# Patient Record
Sex: FEMALE | Race: WHITE | NOT HISPANIC OR LATINO | Employment: FULL TIME | URBAN - METROPOLITAN AREA
[De-identification: names, ages, dates, MRNs, and addresses within clinical notes are randomized per-mention and may not be internally consistent; named-entity substitution may affect disease eponyms.]

---

## 2018-06-06 ENCOUNTER — TRANSCRIBE ORDERS (OUTPATIENT)
Dept: ADMINISTRATIVE | Facility: HOSPITAL | Age: 53
End: 2018-06-06

## 2018-06-06 DIAGNOSIS — Z12.39 SCREENING BREAST EXAMINATION: Primary | ICD-10-CM

## 2018-06-11 ENCOUNTER — HOSPITAL ENCOUNTER (OUTPATIENT)
Dept: RADIOLOGY | Facility: HOSPITAL | Age: 53
Discharge: HOME | End: 2018-06-11
Payer: COMMERCIAL

## 2018-06-11 DIAGNOSIS — Z12.39 SCREENING BREAST EXAMINATION: ICD-10-CM

## 2018-06-11 PROCEDURE — 77067 SCR MAMMO BI INCL CAD: CPT

## 2018-06-13 ENCOUNTER — OFFICE VISIT (OUTPATIENT)
Dept: FAMILY MEDICINE CLINIC | Facility: CLINIC | Age: 53
End: 2018-06-13
Payer: COMMERCIAL

## 2018-06-13 VITALS
HEIGHT: 60 IN | BODY MASS INDEX: 38.09 KG/M2 | HEART RATE: 80 BPM | TEMPERATURE: 98.8 F | DIASTOLIC BLOOD PRESSURE: 76 MMHG | SYSTOLIC BLOOD PRESSURE: 120 MMHG | RESPIRATION RATE: 16 BRPM | WEIGHT: 194 LBS

## 2018-06-13 DIAGNOSIS — E66.9 OBESITY (BMI 30-39.9): ICD-10-CM

## 2018-06-13 DIAGNOSIS — M79.644 FINGER PAIN, RIGHT: Primary | ICD-10-CM

## 2018-06-13 DIAGNOSIS — K21.9 GASTROESOPHAGEAL REFLUX DISEASE, ESOPHAGITIS PRESENCE NOT SPECIFIED: ICD-10-CM

## 2018-06-13 PROCEDURE — 99204 OFFICE O/P NEW MOD 45 MIN: CPT | Performed by: FAMILY MEDICINE

## 2018-06-13 RX ORDER — PROPRANOLOL HYDROCHLORIDE 80 MG/1
TABLET ORAL
COMMUNITY
End: 2018-06-13

## 2018-06-13 RX ORDER — PANTOPRAZOLE SODIUM 40 MG/1
40 TABLET, DELAYED RELEASE ORAL DAILY
Qty: 99 TABLET | Refills: 0
Start: 2018-06-13

## 2018-06-13 NOTE — PROGRESS NOTES
Assessment/Plan:    No problem-specific Assessment & Plan notes found for this encounter  Attempt to remove after elastic finger compression was not successful so the pt agreed to have the ring cut which was performed w/o complication or injury  Ring was cut in 2 places and removed w/o skin injury and pt was given all ring parts  Local care for finger pain was recommended  Procedure required significant efforts but was successful  Entire time with patient took 45 minutes including counseling and prolonged procedure  Diagnoses and all orders for this visit:    Finger pain, right    Gastroesophageal reflux disease, esophagitis presence not specified    Obesity (BMI 30-39  9)    Other orders  -     Discontinue: propranolol (INDERAL) 80 mg tablet; Take by mouth  -     pantoprazole (PROTONIX) 40 mg tablet; Take 1 tablet (40 mg total) by mouth daily              Return if symptoms worsen or fail to improve  Subjective:      Patient ID: Jt Noel is a 45 y o  female  Chief Complaint   Patient presents with   Fifi Verdugo Stuck     pt sts shes here because she cannot get ring off her finger  drhlpn       HPI    New pt  Finger #4 right hand ring is stuck  Been on for 9y  Has gained wt  Arthritis set in   so just decided to remove it  No swelling initially but had some after attempts to remove  Decided to remove and was successful with left #4 successful  Right side stuck however  gerd stable, on pantoprazole  Trigger finger left hand resolved after injection  [facility] but [facility] did not have [facility] cutter    The following portions of the patient's history were reviewed and updated as appropriate: allergies, current medications, past family history, past medical history, past social history, past surgical history and problem list     Review of Systems   Constitutional: Negative for fever  Respiratory: Negative for shortness of breath  Cardiovascular: Negative for chest pain  Gastrointestinal: Negative for abdominal pain  Musculoskeletal: Positive for arthralgias  Negative for joint swelling and myalgias  Skin: Negative for pallor and wound  No current outpatient prescriptions on file  No current facility-administered medications for this visit  Objective:    /76   Pulse 80   Temp 98 8 °F (37 1 °C)   Resp 16   Ht 5' (1 524 m)   Wt 88 kg (194 lb)   LMP 11/07/2016   BMI 37 89 kg/m²        Physical Exam   Constitutional: She appears well-developed  HENT:   Head: Normocephalic  Eyes: Conjunctivae are normal    Neck: Neck supple  Cardiovascular: Normal rate and intact distal pulses  Pulmonary/Chest: Effort normal  No respiratory distress  Abdominal: Soft  Musculoskeletal: She exhibits tenderness  She exhibits no edema or deformity  Finger #2 mild tenderness around PIP joint from ring removal attempt w/o wound or ecchymosis   Neurological: She is alert  Skin: Skin is warm and dry  Psychiatric: Her behavior is normal  Thought content normal    Nursing note and vitals reviewed               Олег Ibarra DO

## 2018-08-31 ENCOUNTER — TELEPHONE (OUTPATIENT)
Dept: FAMILY MEDICINE CLINIC | Facility: CLINIC | Age: 53
End: 2018-08-31

## 2018-09-04 NOTE — TELEPHONE ENCOUNTER
I believe this is not our patient, in office not sts patient [facility] but [facility] not [facility]  Her sister comes here and called to see if we had one  Closing this task   danielle

## 2019-06-05 ENCOUNTER — TRANSCRIBE ORDERS (OUTPATIENT)
Dept: ADMINISTRATIVE | Facility: HOSPITAL | Age: 54
End: 2019-06-05

## 2019-06-05 DIAGNOSIS — Z12.31 VISIT FOR SCREENING MAMMOGRAM: Primary | ICD-10-CM

## 2019-06-13 ENCOUNTER — HOSPITAL ENCOUNTER (OUTPATIENT)
Dept: RADIOLOGY | Facility: HOSPITAL | Age: 54
Discharge: HOME | End: 2019-06-13
Payer: COMMERCIAL

## 2019-06-13 VITALS — WEIGHT: 194 LBS | HEIGHT: 60 IN | BODY MASS INDEX: 38.09 KG/M2

## 2019-06-13 DIAGNOSIS — Z12.31 VISIT FOR SCREENING MAMMOGRAM: ICD-10-CM

## 2019-06-13 PROCEDURE — 77067 SCR MAMMO BI INCL CAD: CPT

## 2019-06-13 PROCEDURE — 77063 BREAST TOMOSYNTHESIS BI: CPT

## 2021-06-28 ENCOUNTER — APPOINTMENT (EMERGENCY)
Dept: RADIOLOGY | Facility: HOSPITAL | Age: 56
End: 2021-06-28
Payer: COMMERCIAL

## 2021-06-28 ENCOUNTER — HOSPITAL ENCOUNTER (OUTPATIENT)
Facility: HOSPITAL | Age: 56
Setting detail: OBSERVATION
Discharge: HOME | End: 2021-06-29
Attending: EMERGENCY MEDICINE | Admitting: INTERNAL MEDICINE
Payer: COMMERCIAL

## 2021-06-28 ENCOUNTER — APPOINTMENT (OUTPATIENT)
Dept: RADIOLOGY | Facility: HOSPITAL | Age: 56
End: 2021-06-28
Payer: COMMERCIAL

## 2021-06-28 ENCOUNTER — APPOINTMENT (OUTPATIENT)
Dept: NON INVASIVE DIAGNOSTICS | Facility: HOSPITAL | Age: 56
End: 2021-06-28
Payer: COMMERCIAL

## 2021-06-28 DIAGNOSIS — N39.0 UTI (URINARY TRACT INFECTION): ICD-10-CM

## 2021-06-28 DIAGNOSIS — R42 DIZZINESS: Primary | ICD-10-CM

## 2021-06-28 DIAGNOSIS — Z72.0 NICOTINE ABUSE: ICD-10-CM

## 2021-06-28 PROBLEM — R82.71 BACTERIURIA: Status: ACTIVE | Noted: 2021-06-28

## 2021-06-28 LAB
ALBUMIN SERPL BCP-MCNC: 3.6 G/DL (ref 3.5–5)
ALP SERPL-CCNC: 79 U/L (ref 46–116)
ALT SERPL W P-5'-P-CCNC: 31 U/L (ref 12–78)
ANION GAP SERPL CALCULATED.3IONS-SCNC: 12 MMOL/L (ref 4–13)
AST SERPL W P-5'-P-CCNC: 19 U/L (ref 5–45)
ATRIAL RATE: 80 BPM
BACTERIA UR QL AUTO: ABNORMAL /HPF
BASOPHILS # BLD AUTO: 0.05 THOUSANDS/ΜL (ref 0–0.1)
BASOPHILS NFR BLD AUTO: 1 % (ref 0–1)
BILIRUB SERPL-MCNC: 0.27 MG/DL (ref 0.2–1)
BILIRUB UR QL STRIP: NEGATIVE
BUN SERPL-MCNC: 15 MG/DL (ref 5–25)
CALCIUM SERPL-MCNC: 9.1 MG/DL (ref 8.3–10.1)
CHLORIDE SERPL-SCNC: 105 MMOL/L (ref 100–108)
CLARITY UR: CLEAR
CO2 SERPL-SCNC: 26 MMOL/L (ref 21–32)
COLOR UR: YELLOW
CREAT SERPL-MCNC: 0.77 MG/DL (ref 0.6–1.3)
EOSINOPHIL # BLD AUTO: 0.41 THOUSAND/ΜL (ref 0–0.61)
EOSINOPHIL NFR BLD AUTO: 5 % (ref 0–6)
ERYTHROCYTE [DISTWIDTH] IN BLOOD BY AUTOMATED COUNT: 13.2 % (ref 11.6–15.1)
GFR SERPL CREATININE-BSD FRML MDRD: 86 ML/MIN/1.73SQ M
GLUCOSE SERPL-MCNC: 122 MG/DL (ref 65–140)
GLUCOSE UR STRIP-MCNC: NEGATIVE MG/DL
HCT VFR BLD AUTO: 39 % (ref 34.8–46.1)
HGB BLD-MCNC: 12.7 G/DL (ref 11.5–15.4)
HGB UR QL STRIP.AUTO: NEGATIVE
IMM GRANULOCYTES # BLD AUTO: 0.15 THOUSAND/UL (ref 0–0.2)
IMM GRANULOCYTES NFR BLD AUTO: 2 % (ref 0–2)
KETONES UR STRIP-MCNC: NEGATIVE MG/DL
LEUKOCYTE ESTERASE UR QL STRIP: ABNORMAL
LYMPHOCYTES # BLD AUTO: 3.43 THOUSANDS/ΜL (ref 0.6–4.47)
LYMPHOCYTES NFR BLD AUTO: 38 % (ref 14–44)
MCH RBC QN AUTO: 29.4 PG (ref 26.8–34.3)
MCHC RBC AUTO-ENTMCNC: 32.6 G/DL (ref 31.4–37.4)
MCV RBC AUTO: 90 FL (ref 82–98)
MONOCYTES # BLD AUTO: 0.55 THOUSAND/ΜL (ref 0.17–1.22)
MONOCYTES NFR BLD AUTO: 6 % (ref 4–12)
NEUTROPHILS # BLD AUTO: 4.54 THOUSANDS/ΜL (ref 1.85–7.62)
NEUTS SEG NFR BLD AUTO: 48 % (ref 43–75)
NITRITE UR QL STRIP: NEGATIVE
NON-SQ EPI CELLS URNS QL MICRO: ABNORMAL /HPF
NRBC BLD AUTO-RTO: 0 /100 WBCS
P AXIS: 48 DEGREES
PH UR STRIP.AUTO: 6.5 [PH]
PLATELET # BLD AUTO: 300 THOUSANDS/UL (ref 149–390)
PMV BLD AUTO: 9.5 FL (ref 8.9–12.7)
POTASSIUM SERPL-SCNC: 3.7 MMOL/L (ref 3.5–5.3)
PR INTERVAL: 124 MS
PROT SERPL-MCNC: 7.3 G/DL (ref 6.4–8.2)
PROT UR STRIP-MCNC: NEGATIVE MG/DL
QRS AXIS: 8 DEGREES
QRSD INTERVAL: 82 MS
QT INTERVAL: 392 MS
QTC INTERVAL: 452 MS
RBC # BLD AUTO: 4.32 MILLION/UL (ref 3.81–5.12)
RBC #/AREA URNS AUTO: ABNORMAL /HPF
SODIUM SERPL-SCNC: 143 MMOL/L (ref 136–145)
SP GR UR STRIP.AUTO: 1.01 (ref 1–1.03)
T WAVE AXIS: 31 DEGREES
TROPONIN I SERPL-MCNC: <0.02 NG/ML
TSH SERPL DL<=0.05 MIU/L-ACNC: 1.22 UIU/ML (ref 0.36–3.74)
UROBILINOGEN UR QL STRIP.AUTO: 0.2 E.U./DL
VENTRICULAR RATE: 80 BPM
VIT B12 SERPL-MCNC: 391 PG/ML (ref 100–900)
WBC # BLD AUTO: 9.13 THOUSAND/UL (ref 4.31–10.16)
WBC #/AREA URNS AUTO: ABNORMAL /HPF

## 2021-06-28 PROCEDURE — 85025 COMPLETE CBC W/AUTO DIFF WBC: CPT | Performed by: EMERGENCY MEDICINE

## 2021-06-28 PROCEDURE — 96365 THER/PROPH/DIAG IV INF INIT: CPT

## 2021-06-28 PROCEDURE — 70551 MRI BRAIN STEM W/O DYE: CPT

## 2021-06-28 PROCEDURE — 99219 PR INITIAL OBSERVATION CARE/DAY 50 MINUTES: CPT | Performed by: INTERNAL MEDICINE

## 2021-06-28 PROCEDURE — 93005 ELECTROCARDIOGRAM TRACING: CPT

## 2021-06-28 PROCEDURE — 93306 TTE W/DOPPLER COMPLETE: CPT

## 2021-06-28 PROCEDURE — 99285 EMERGENCY DEPT VISIT HI MDM: CPT | Performed by: EMERGENCY MEDICINE

## 2021-06-28 PROCEDURE — 70496 CT ANGIOGRAPHY HEAD: CPT

## 2021-06-28 PROCEDURE — 93010 ELECTROCARDIOGRAM REPORT: CPT | Performed by: INTERNAL MEDICINE

## 2021-06-28 PROCEDURE — 82607 VITAMIN B-12: CPT | Performed by: NURSE PRACTITIONER

## 2021-06-28 PROCEDURE — 80053 COMPREHEN METABOLIC PANEL: CPT | Performed by: EMERGENCY MEDICINE

## 2021-06-28 PROCEDURE — 36415 COLL VENOUS BLD VENIPUNCTURE: CPT | Performed by: EMERGENCY MEDICINE

## 2021-06-28 PROCEDURE — 96361 HYDRATE IV INFUSION ADD-ON: CPT

## 2021-06-28 PROCEDURE — 99285 EMERGENCY DEPT VISIT HI MDM: CPT

## 2021-06-28 PROCEDURE — 70498 CT ANGIOGRAPHY NECK: CPT

## 2021-06-28 PROCEDURE — 81001 URINALYSIS AUTO W/SCOPE: CPT | Performed by: EMERGENCY MEDICINE

## 2021-06-28 PROCEDURE — 84484 ASSAY OF TROPONIN QUANT: CPT | Performed by: EMERGENCY MEDICINE

## 2021-06-28 PROCEDURE — 99205 OFFICE O/P NEW HI 60 MIN: CPT | Performed by: PSYCHIATRY & NEUROLOGY

## 2021-06-28 PROCEDURE — 87086 URINE CULTURE/COLONY COUNT: CPT | Performed by: NURSE PRACTITIONER

## 2021-06-28 PROCEDURE — G1004 CDSM NDSC: HCPCS

## 2021-06-28 PROCEDURE — 84443 ASSAY THYROID STIM HORMONE: CPT | Performed by: EMERGENCY MEDICINE

## 2021-06-28 RX ORDER — ACETAMINOPHEN 325 MG/1
650 TABLET ORAL EVERY 6 HOURS PRN
Status: DISCONTINUED | OUTPATIENT
Start: 2021-06-28 | End: 2021-06-29 | Stop reason: HOSPADM

## 2021-06-28 RX ORDER — ASPIRIN 81 MG/1
81 TABLET ORAL DAILY
Status: DISCONTINUED | OUTPATIENT
Start: 2021-06-29 | End: 2021-06-29 | Stop reason: HOSPADM

## 2021-06-28 RX ORDER — NICOTINE 21 MG/24HR
1 PATCH, TRANSDERMAL 24 HOURS TRANSDERMAL DAILY
Status: DISCONTINUED | OUTPATIENT
Start: 2021-06-28 | End: 2021-06-29 | Stop reason: HOSPADM

## 2021-06-28 RX ORDER — PANTOPRAZOLE SODIUM 40 MG/1
40 TABLET, DELAYED RELEASE ORAL
Status: DISCONTINUED | OUTPATIENT
Start: 2021-06-29 | End: 2021-06-29 | Stop reason: HOSPADM

## 2021-06-28 RX ORDER — SODIUM CHLORIDE 9 MG/ML
100 INJECTION, SOLUTION INTRAVENOUS CONTINUOUS
Status: DISCONTINUED | OUTPATIENT
Start: 2021-06-28 | End: 2021-06-29

## 2021-06-28 RX ORDER — MECLIZINE HYDROCHLORIDE 25 MG/1
50 TABLET ORAL ONCE
Status: COMPLETED | OUTPATIENT
Start: 2021-06-28 | End: 2021-06-28

## 2021-06-28 RX ORDER — CEFTRIAXONE 1 G/50ML
1000 INJECTION, SOLUTION INTRAVENOUS EVERY 24 HOURS
Status: DISCONTINUED | OUTPATIENT
Start: 2021-06-29 | End: 2021-06-29 | Stop reason: HOSPADM

## 2021-06-28 RX ORDER — CEFTRIAXONE 1 G/50ML
1000 INJECTION, SOLUTION INTRAVENOUS ONCE
Status: COMPLETED | OUTPATIENT
Start: 2021-06-28 | End: 2021-06-28

## 2021-06-28 RX ORDER — ASPIRIN 81 MG/1
81 TABLET ORAL DAILY
COMMUNITY

## 2021-06-28 RX ORDER — ONDANSETRON 2 MG/ML
4 INJECTION INTRAMUSCULAR; INTRAVENOUS EVERY 6 HOURS PRN
Status: DISCONTINUED | OUTPATIENT
Start: 2021-06-28 | End: 2021-06-29 | Stop reason: HOSPADM

## 2021-06-28 RX ADMIN — MECLIZINE HYDROCHLORIDE 50 MG: 25 TABLET ORAL at 06:41

## 2021-06-28 RX ADMIN — IOHEXOL 85 ML: 350 INJECTION, SOLUTION INTRAVENOUS at 08:09

## 2021-06-28 RX ADMIN — SODIUM CHLORIDE 1000 ML: 0.9 INJECTION, SOLUTION INTRAVENOUS at 06:37

## 2021-06-28 RX ADMIN — SODIUM CHLORIDE 100 ML/HR: 0.9 INJECTION, SOLUTION INTRAVENOUS at 19:47

## 2021-06-28 RX ADMIN — CEFTRIAXONE 1000 MG: 1 INJECTION, SOLUTION INTRAVENOUS at 08:59

## 2021-06-28 NOTE — ASSESSMENT & PLAN NOTE
Patient presents with feeling of Orta Ari "in a cloud" since Saturday afternoon 2 days ago  Reports worse in sitting up or walking, better when laying in bed  Denies focal neuro deficit  Denies family history of CVA or MI   CTA head and neck in ED showed no acute intracranial abnormality, unremarkable CTA  EKG normal sinus rhythm  Troponin negative, telemetry without any arrhythmia  Patient received Antivert, NS bolus, IV Rocephin in ED  MRI brain without any acute abnormality  2D echo normal EF   TSH, B12 within normal limit  Orthostatic negative  Seen by neuro, input appreciated  Likely atypical symptoms in setting of dehydration/possible UTI/BPPV  Clinically improved now asymptomatic  · Recommended hydration  · Diet and lifestyle modification  · Monitoring of symptoms, outpatient PT and meclizine if recurrence of symptoms    Patient was educated

## 2021-06-28 NOTE — ASSESSMENT & PLAN NOTE
Patient asymptomatic    Received IV Rocephin in ED  Will hold off on further antibiotic  Check urine culture

## 2021-06-28 NOTE — CONSULTS
[facility]   Neurology Initial Consult    Ema Gustafson is a 60 y o  female  [address] [facility] [address]          Information obtained from:   Chief Complaint   Patient presents with    Dizziness     Pt states she feels like she has been walking lop-sided for 3d now  No hx of vertigo  Assessment/Plan:    1  Encephalopathy  2  UTI  3  dizziness    -Monitored on Telemetry  -Vitals/Neuro Assessments  -MRI of the Brain- rule out central process  Negative for acute findings  -Abx per Medical Management  -can use meclizine 25mg q8hrs prn Vertigo  -Echo with shunt study  -  Labs: Lipid profile and A1c  -PT/OT    Patient is a 77-year-old female with a transient episode of vertigo like dizziness on Saturday  Patient stated things were spinning around her she felt as though she could fall  Patient stated that she had some improvement when able to lay down however since that time she has felt off  Patient states she does not feel herself feels as though she has a little bit foggy  Patient reports that she does not have much oral hydration at baseline, thought maybe she was dehydrated however her labs not reflect this  Patient reports having frequency of urination at baseline, denied having any dysuria with regards to pain or urgency  Upon arrival to the ER, patient's UA was positive for leukocytes, wbc's and bacteria  CTA of the head and neck was negative for any acute findings  Patient denies having any current dizziness / vertigo however reports feeling like she is in a brain fog  Patient did report having some gait ataxia, feels as though she is walking off to the side  Patient reports that she has had some dietary changes, questions whether not this could be related  Was unaware that she was having a UTI, relatively asymptomatic with exception to her current altered mental status  MRI of the brain was negative for any acute ischemic findings    Echocardiogram with shunt study being performed to rule out any other cardiogenic cause  Suspect patient with metabolic encephalopathy secondary to urinary tract infection  IV fluids and antibiotics managed per medical team   Patient can have meclizine 25 mg q 8 hours as needed for vertigo type dizziness  Will get lipid profile and A1c regarding possible vascular risks  PT/OT to eval as patient does have ataxia with tandem gait and positive Romberg sign  NIHSS:  1a Level of Consciousness: 0 = Alert   1b  LOC Questions: 0 = Answers both correctly   1c  LOC Commands: 0 = Obeys both correctly   2  Best Gaze: 0 = Normal   3  Visual: 0 = No visual field loss   4  Facial Palsy: 0=Normal symmetric movement   5a  Motor Right Arm: 0=No drift, limb holds 90 (or 45) degrees for full 10 seconds   5b  Motor Left Arm: 0=No drift, limb holds 90 (or 45) degrees for full 10 seconds   6a  Motor Right Le=No drift, limb holds 90 (or 45) degrees for full 10 seconds   6b  Motor Left Le=No drift, limb holds 90 (or 45) degrees for full 10 seconds   7  Limb Ataxia:  0=Absent   8  Sensory: 0=Normal; no sensory loss   9  Best Language:  0=No aphasia, normal   10  Dysarthria: 0=Normal articulation   11  Extinction and Inattention (formerly Neglect): 0=No abnormality   Total Score: 0         Chika White will need follow up in in 3 months with general attending or advance practitioner  She will not require outpatient neurological testing  Re: dizziness      HPI:  Chika White is a 39-year-old female with no significant past medical history who was brought to the ER with complaints of ongoing dizziness  Patient stated that on 2021 she was at a shower, playing and dealing with her granddaughter when all of a sudden things started spinning around her  Patient stated that after this she could felt like she was in a fall    Stated that it did seem to improve a little bit when she was able to lay down however when sitting about she stated she just did not feel herself  Patient had a difficult time explaining this sensation however just stated that she felt off and in a "fog "patient stated that this continued into Sunday and then again this morning she was still not herself  Patient denied having any significant headache with these dizzy events  Currently she has no headache  She states that approximately 2 weeks ago she was having some headaches which is unusual for her  But it was thought to be due to irregular fluctuations in the weather  Patient denies having any fatigue or weakness  She denies having any recent or current acute illness  Patient does have urinary frequency which she reports is fairly normal for her  She denies having any dysuria or urgency with this  Patient states that she does not drink a lot of fluid so thought maybe she was dehydrated however her lab seem to be okay  CTA of the head and neck done, no acute intracranial abnormalities, patent major vasculature the [place] without high-grade stenosis no aneurysms noted  Abnormal labs included a UA with positive leukocytes as well as WBC/ bacteria  Suspect patient with UTI  Patient being admitted under observation for further follow-up with MRI in relation to her dizziness  Patient evaluated at bedside, denies having any room spinning type dizziness at this time  She does report having about 1 episode on Saturday where things were spinning around her however currently she reports  "Feeling not herself "and "foggy "  Neurological exam is nonfocal, has equal strength, sensation and reflexes bilaterally in the upper and lower extremities  Patient alert and oriented x3, speech is fluent, conversation is understandable  Patient is able to comprehend and engage and more complex conversation  Coordination finger to nose and heel to shin is intact    Patient has steady casual gait, able to heel-toe walk patient does have positive Romberg however and difficulty with tandem gait  Patient is scheduled for MRI which is has been completed and negative for acute ischemic disease  Suspect pt with AMS/Encephalopathy second to UTI  History reviewed  No pertinent past medical history  Past Surgical History:   Procedure Laterality Date    BREAST CYST ASPIRATION Left        No Known Allergies    No current facility-administered medications for this encounter  Social History     Socioeconomic History    Marital status:      Spouse name: Not on file    Number of children: Not on file    Years of education: Not on file    Highest education level: Not on file   Occupational History    Not on file   Tobacco Use    Smoking status: Current Every Day Smoker     Packs/day: 1 00     Last attempt to quit: 2012     Years since quittin 0    Smokeless tobacco: Never Used   Vaping Use    Vaping Use: Never used   Substance and Sexual Activity    Alcohol use: Yes     Comment: occ    Drug use: Not Currently    Sexual activity: Not on file   Other Topics Concern    Not on file   Social History Narrative    Not on file     Social Determinants of Health     Financial Resource Strain:     Difficulty of Paying Living Expenses:    Food Insecurity:     Worried About Running Out of Food in the Last Year:     [address] in the Last Year:    Transportation Needs:     Lack of Transportation (Medical):      Lack of Transportation (Non-Medical):    Physical Activity:     Days of Exercise per Week:     Minutes of Exercise per Session:    Stress:     Feeling of Stress :    Social Connections:     Frequency of Communication with Friends and Family:     Frequency of Social Gatherings with Friends and Family:     Attends [employer] Services:     Active Member of Clubs or Organizations:     Attends Club or Organization Meetings:     Marital Status:    Intimate Partner Violence:     Fear of Current or Ex-Partner:     Emotionally Abused:     Physically Abused:     Sexually Abused:        Family History   Problem Relation Age of Onset    No Known Problems Sister     No Known Problems Sister          Review of systems:  Please see HPI for positive symptoms  Constitutional: No fever, no chills, no weight change  + "foggy "  -dizziness  Ocular: No diplopia, no blurred vision, spots/zigzag lines  HEENT:  No sore throat, headache or congestion  COR:  No chest pain  No palpitations  Lungs:  no sob  GI:  no  nausea, no vomiting, no diarrhea, no constipation, no anorexia  :  No dysuria, frequency, or urgency  No hematuria  Musculoskeletal:  No joint pain or swelling   Skin:  No rash or itching  Psychiatric:  no anxiety, no depression  Endocrine:  No polyuria or polydipsia  Physical examination:  /65 (BP Location: Right arm)   Pulse 69   Temp 97 9 °F (36 6 °C) (Tympanic)   Resp 20   Ht 5' (1 524 m)   Wt 86 kg (189 lb 9 5 oz)   LMP 11/07/2016   SpO2 96%   BMI 37 03 kg/m²     GENERAL APPEARANCE:  The patient is alert, oriented  HEENT:  Head is normocephalic  Pupils are equal and reactive  NECK:  Supple without lymphadenopathy  HEART:  Regular rate and rhythm  LUNGS:  No audible wheezing or stridor heard  ABDOMEN:  Soft, nontender, nondistended with good bowel sounds heard  EXTREMITIES:  Without cyanosis, clubbing or edema  Mental status: The patient is alert, attentive, and oriented  Speech is clear and fluent, good repetition, comprehension, and naming  Cranial nerves:  CN II: Visual fields are full to confrontation  Fundoscopic exam is normal with sharp discs and no vascular changes  Pupils are 3 mm and briskly reactive to light  CN III, IV, VI: At primary gaze, there is no eye deviation  CN V: Facial sensation is intact  in all 3 divisions bilaterally  Corneal responses are intact  CN VII: Face is symmetric with normal eye closure and smile    CN VIII: Hearing is normal to rubbing fingers  CN IX, X: Palate elevates symmetrically  Phonation is normal   CN XI: Head turning and shoulder shrug are intact  CN XII: Tongue is midline with normal movements and no atrophy  Motor: There is no pronator drift of out-stretched arms  Muscle bulk and tone are normal    Muscle exam  Arm Right Left Leg Right Left   Deltoid 5/5 5/5 Iliopsoas 5/5 5/5   Biceps 5/5 5/5 Quads 5/5 5/5   Triceps 5/5 5/5 Hamstrings 5/5 5/5   Wrist Extension 5/5 5/5 Ankle Dorsi Flexion 5/5 5/5   Wrist Flexion 5/5 5/5 Ankle Plantar Flexion 5/5 5/5        Reflexes    RJ BJ TJ KJ AJ Plantars Maxwell's   Right 2+ 2+ 2+ 2+ 2+ Downgoing Not present   Left 2+ 2+ 2+ 2+ 2+ Downgoing Not present      Sensory:  Light touch, Temperature, position sense, and vibration sense are intact in fingers and toes  Coordination:  Rapid alternating movements and fine finger movements are intact  There is no dysmetria on finger-to-nose and heel-knee-shin  There are no abnormal or extraneous movements  Romberg +  Gait/Stance:  Normal heel/toe walking and tandem gait with mild imbalance  Lab Results   Component Value Date    WBC 9 13 06/28/2021    HGB 12 7 06/28/2021    HCT 39 0 06/28/2021    MCV 90 06/28/2021     06/28/2021     No results found for: HGBA1C  Lab Results   Component Value Date    ALT 31 06/28/2021    AST 19 06/28/2021    ALKPHOS 79 06/28/2021     Lab Results   Component Value Date    CALCIUM 9 1 06/28/2021    K 3 7 06/28/2021    CO2 26 06/28/2021     06/28/2021    BUN 15 06/28/2021    CREATININE 0 77 06/28/2021         Review of reports and notes reveal:  Independent Interpretation of images or specimens:  CTA head and neck with and without contrast    Result Date: 6/28/2021  1  No acute intracranial CT abnormality  2   Patent major vasculature of [place] without high-grade stenosis  No aneurysm  3   Unremarkable CT angiogram of the neck  Workstation performed: [id number]           Thank you for this consult      Total time of encounter: 79 Minutes  More than 50% of time was spent in counseling and coordination of care of patient

## 2021-06-28 NOTE — ASSESSMENT & PLAN NOTE
Patient asymptomatic, but reports urinary frequency  Denies history of recurrent UTI  Received IV Rocephin in ED  Urine culture pending  Patient received IV Rocephin x2 days    Will do Keflex x 1 day to complete 3 days  Advised to follow-up with PCP to follow-up for hematuria, advised of further workup if persistent

## 2021-06-28 NOTE — ASSESSMENT & PLAN NOTE
Nicotine patch, smoking cessation  UA + RBC  Recommend outpatient renal/bladder ultrasound if urine culture negative for infection to rule out mass due to occult hematuria  High risk for cancer due to nicotine abuse

## 2021-06-28 NOTE — H&P
Carloyn 43  H&P- Christinareed Gargy 1965, 60 y o  female MRN: [id number]  Unit/Bed#: [address] [place] [address] Encounter: [id number]  Primary Care Provider: Debi Riggs MD   Date and time admitted to hospital: 6/28/2021  6:23 AM    * Dizziness  Assessment & Plan  Patient presents with feeling of dizziness,like "in a cloud" since Saturday afternoon 2 days ago  Reports worse in sitting up or walking, better when laying in bed  Denies focal neuro deficit  Denies family history of CVA or MI   CTA head and neck in ED showed no acute intracranial abnormality, unremarkable CTA  EKG normal sinus rhythm  Troponin negative  UA shows bacteria, patient asymptomatic  Patient received Antivert, NS bolus, IV Rocephin in ED  Check MRI brain, 2D echo with bubble study  Check B12  Check orthostatic vital signs  Telemetry  PT OT eval treat  Consult neurology      Bacteriuria  Assessment & Plan  Patient asymptomatic  Received IV Rocephin in ED  Will hold off on further antibiotic  Check urine culture    Nicotine abuse  Assessment & Plan  Nicotine patch, smoking cessation  UA + RBC  Recommend outpatient renal/bladder ultrasound if urine culture negative for infection to rule out mass due to occult hematuria  High risk for cancer due to nicotine abuse  GERD (gastroesophageal reflux disease)  Assessment & Plan  Continue PPI    Chronic back pain  Assessment & Plan  Reports some back pain currently likely due to hospital bed  On baby aspirin daily at home for unknown indication  Will continue  Tylenol p r n  Obesity (BMI 30-39  9)  Assessment & Plan  Body mass index is 37 03 kg/m²  Diet and lifestyle modification      VTE Prophylaxis: Enoxaparin (Lovenox)  / reason for no mechanical VTE prophylaxis On Lovenox   Code Status:  Full code  POLST: POLST form is not discussed and not completed at this time      Anticipated Length of Stay:  Patient will be admitted on an Observation basis with an anticipated length of stay of  < 2 midnights  Justification for Hospital Stay:  Dizziness    Total Time for Visit, including Counseling / Coordination of Care: 45 minutes  Greater than 50% of this total time spent on direct patient counseling and coordination of care  Chief Complaint:   Feeling of dizziness,like " in a cloud"    History of Present Illness:    Steph Chand is a 60 y o  female with PMH of GERD, chronic back pain, obesity, nicotine abuse who presents with feeling of dizziness,like in a cloud  Patient denies focal neuro deficit  Patient reports feeling better laying in bed, symptoms worse when sitting up or walking  Reports associated nausea at home, no vomiting  Reports headache this morning  Reports numbness tingling to hands and feet at times which is chronic  Denies feeling of things spinning around her  Denies chest pain, SOB, cough, nausea, vomiting, diarrhea, constipation, fever, chills  Denies urinary urgency, frequency, burning on urination  Denies history of UTI  Denies family history of CVA or MI  Review of Systems:    Review of Systems   Musculoskeletal: Positive for back pain  Neurological: Positive for dizziness and headaches  Feeling in a Cloud   All other systems reviewed and are negative  Past Medical and Surgical History:     Past Medical History:   Diagnosis Date    Chronic back pain     GERD (gastroesophageal reflux disease)        Past Surgical History:   Procedure Laterality Date    BREAST CYST ASPIRATION Left 2004       Meds/Allergies:    Prior to Admission medications    Medication Sig Start Date End Date Taking? Authorizing Provider   aspirin (ECOTRIN LOW STRENGTH) 81 mg EC tablet Take 81 mg by mouth daily   Yes Historical Provider, MD   pantoprazole (PROTONIX) 40 mg tablet Take 1 tablet (40 mg total) by mouth daily 6/13/18  Yes Jason Marroquin, DO     I have reviewed home medications with patient personally      Allergies: No Known Allergies    Social History: Marital Status:    Occupation:    Patient Pre-hospital Living Situation:  Lives with family  Patient Pre-hospital Level of Mobility:  Independent  Patient Pre-hospital Diet Restrictions:  Regular  Substance Use History:   Social History     Substance and Sexual Activity   Alcohol Use Yes    Comment: occ     Social History     Tobacco Use   Smoking Status Current Every Day Smoker    Packs/day: 1 00    Last attempt to quit: 2012    Years since quittin 0   Smokeless Tobacco Never Used     Social History     Substance and Sexual Activity   Drug Use Not Currently       Family History:    non-contributory    Physical Exam:     Vitals:   Blood Pressure: 130/74 (21 1542)  Pulse: 70 (21 1542)  Temperature: 98 2 °F (36 8 °C) (21 1542)  Temp Source: Oral (21 1542)  Respirations: 20 (21 1542)  Height: 5' (152 4 cm) (21 1212)  Weight - Scale: 86 kg (189 lb 9 5 oz) (21 1212)  SpO2: 99 % (21 1542)    Physical Exam  Vitals and nursing note reviewed  Constitutional:       Appearance: She is well-developed  She is obese  HENT:      Head: Normocephalic and atraumatic  Eyes:      Extraocular Movements: Extraocular movements intact  Pupils: Pupils are equal, round, and reactive to light  Neck:      Thyroid: No thyromegaly  Vascular: No JVD  Trachea: No tracheal deviation  Cardiovascular:      Rate and Rhythm: Normal rate and regular rhythm  Heart sounds: Normal heart sounds  No murmur heard  Pulmonary:      Effort: Pulmonary effort is normal  No respiratory distress  Breath sounds: Normal breath sounds  No wheezing or rales  Abdominal:      General: Bowel sounds are normal  There is no distension  Palpations: Abdomen is soft  Tenderness: There is no abdominal tenderness  There is no guarding  Musculoskeletal:         General: No swelling or deformity  Normal range of motion        Cervical back: Neck supple  Right lower leg: No edema  Left lower leg: No edema  Skin:     General: Skin is warm and dry  Neurological:      General: No focal deficit present  Mental Status: She is alert and oriented to person, place, and time  Comments: All extremity strong,5/5   Psychiatric:         Mood and Affect: Mood normal          Judgment: Judgment normal          Additional Data:     Lab Results: I have personally reviewed pertinent reports  Results from last 7 days   Lab Units 06/28/21  0642   WBC Thousand/uL 9 13   HEMOGLOBIN g/dL 12 7   HEMATOCRIT % 39 0   PLATELETS Thousands/uL 300   NEUTROS PCT % 48   LYMPHS PCT % 38   MONOS PCT % 6   EOS PCT % 5     Results from last 7 days   Lab Units 06/28/21  0642   POTASSIUM mmol/L 3 7   CHLORIDE mmol/L 105   CO2 mmol/L 26   BUN mg/dL 15   CREATININE mg/dL 0 77   CALCIUM mg/dL 9 1   ALK PHOS U/L 79   ALT U/L 31   AST U/L 19           Imaging: I have personally reviewed pertinent reports  CTA head and neck with and without contrast    Result Date: 6/28/2021  Narrative: CTA NECK AND BRAIN WITH AND WITHOUT CONTRAST INDICATION: dizziness COMPARISON:   None  TECHNIQUE:  Routine CT imaging of the Brain without contrast   Post contrast imaging was performed after administration of iodinated contrast through the neck and brain  Post contrast axial 0 625 mm images timed to opacify the arterial system  3D rendering was performed on an independent workstation  MIP reconstructions performed  Coronal reconstructions were performed of the noncontrast portion of the brain  Radiation dose length product (DLP) for this visit:  681 mGy-cm   This examination, like all CT scans performed in the [facility], was performed utilizing techniques to minimize radiation dose exposure, including the use of iterative reconstruction and automated exposure control     IV Contrast:  85 mL of iohexol (OMNIPAQUE)  IMAGE QUALITY:   Diagnostic FINDINGS: NONCONTRAST BRAIN PARENCHYMA:  No intracranial masslike lesion, mass effect or midline shift  No CT signs of acute infarction  No acute parenchymal hemorrhage  VENTRICLES AND EXTRA-AXIAL SPACES:  Normal for the patient's age  VISUALIZED ORBITS AND PARANASAL SINUSES:  Unremarkable  CERVICAL VASCULATURE AORTIC ARCH AND GREAT VESSELS: Aortic arch and great vessel origins are unremarkable  RIGHT VERTEBRAL ARTERY CERVICAL SEGMENT:The vessel origin is unremarkable  The vessel is patent throughout the neck without high-grade stenosis  LEFT VERTEBRAL ARTERY CERVICAL SEGMENT: The vessel origin is unremarkable  The vessel is patent throughout the neck without high-grade stenosis  RIGHT EXTRACRANIAL CAROTID SEGMENT:The carotid bifurcation and cervical internal carotid artery are unremarkable  No stenosis or dissection  LEFT EXTRACRANIAL CAROTID SEGMENT: There is minimal hydrostatic calcification at the bifurcation  No stenosis or dissection  INTRACRANIAL VASCULATURE INTERNAL CAROTID ARTERIES: The intracranial portions of the internal carotid arteries are unremarkable  Normal ophthalmic artery origins  ANTERIOR CIRCULATION:  Normal A1 segments  Bilateral anterior cerebral arteries are unremarkable  Normal anterior comminuting artery  MIDDLE CEREBRAL ARTERY CIRCULATION:  Bilateral M1 segments and major M2 branches are patent without high-grade stenosis  DISTAL VERTEBRAL ARTERIES:  Distal vertebral arteries are patent without high-grade stenosis  Posterior inferior cerebellar artery origins are patent  BASILAR ARTERY:  Basilar artery is unremarkable  Normal superior cerebellar arteries  POSTERIOR CEREBRAL ARTERIES: 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  Bilateral posterior cerebral arteries are patent without high-grade stenosis  Normal posterior communicating arteries  DURAL VENOUS SINUSES:  Unremarkable  NON VASCULAR ANATOMY BONY STRUCTURES: No acute or aggressive appearing osseous abnormality     SOFT TISSUES OF THE NECK:  Unremarkable  THORACIC INLET:  Unremarkable  Impression: 1  No acute intracranial CT abnormality  2   Patent major vasculature of [place] without high-grade stenosis  No aneurysm  3   Unremarkable CT angiogram of the neck  Workstation performed: [id number]       EKG, Pathology, and Other Studies Reviewed on Admission:   · EKG:  Normal sinus rhythm    Allscripts Records Reviewed: Yes     ** Please Note: Dragon 360 Dictation voice to text software may have been used in the creation of this document   **

## 2021-06-28 NOTE — PLAN OF CARE
Problem: Potential for Falls  Goal: Patient will remain free of falls  Description: INTERVENTIONS:  - Educate patient/family on patient safety including physical limitations  - Instruct patient to call for assistance with activity   - Consult OT/PT to assist with strengthening/mobility   - Keep Call bell within reach  - Keep bed low and locked with side rails adjusted as appropriate  - Keep care items and personal belongings within reach  - Initiate and maintain comfort rounds  - Make Fall Risk Sign visible to staff  - Offer Toileting every 2 Hours, in advance of need  - Initiate/Maintain bed/chair alarm  - Obtain necessary fall risk management equipment: bed/chair  - Apply yellow socks and bracelet for high fall risk patients  - Consider moving patient to room near nurses station  Outcome: Progressing     Problem: DISCHARGE PLANNING  Goal: Discharge to home or other facility with appropriate resources  Description: INTERVENTIONS:  - Identify barriers to discharge w/patient and caregiver  - Arrange for needed discharge resources and transportation as appropriate  - Identify discharge learning needs (meds, wound care, etc )  - Arrange for interpretive services to assist at discharge as needed  - Refer to Case Management Department for coordinating discharge planning if the patient needs post-hospital services based on physician/advanced practitioner order or complex needs related to functional status, cognitive ability, or social support system  Outcome: Progressing     Problem: Knowledge Deficit  Goal: Patient/family/caregiver demonstrates understanding of disease process, treatment plan, medications, and discharge instructions  Description: Complete learning assessment and assess knowledge base    Interventions:  - Provide teaching at level of understanding  - Provide teaching via preferred learning methods  Outcome: Progressing

## 2021-06-28 NOTE — ASSESSMENT & PLAN NOTE
Patient presents with feeling of [place] Wili "in a cloud" since Saturday afternoon 2 days ago  Reports worse in sitting up or walking, better when laying in bed  Denies focal neuro deficit  Denies family history of CVA or MI   CTA head and neck in ED showed no acute intracranial abnormality, unremarkable CTA  EKG normal sinus rhythm  Troponin negative  UA shows bacteria, patient asymptomatic    Patient received Antivert, NS bolus, IV Rocephin in ED  Check MRI brain, 2D echo with bubble study  Check B12  Check orthostatic vital signs  Telemetry  PT OT eval treat  Consult neurology

## 2021-06-28 NOTE — ED PROVIDER NOTES
History  Chief Complaint   Patient presents with    Dizziness     Pt states she feels like she has been walking lop-sided for 3d now  No hx of vertigo  Patient presents for evaluation of dizziness for 3 days  Symptoms are fairly constant  Describes it as being in a cloud and walking lopsided  It was difficult for the drive today  Denies any apparent modifying factors for her symptoms  Denies any headache  Denies any other numbness or weakness  No visual changes  No history of vertigo in the past       History provided by:  Patient   used: No    Dizziness  Associated symptoms: no chest pain, no headaches, no palpitations, no shortness of breath, no vomiting and no weakness        Prior to Admission Medications   Prescriptions Last Dose Informant Patient Reported? Taking?   aspirin (ECOTRIN LOW STRENGTH) 81 mg EC tablet 2021 at Unknown time Self Yes Yes   Sig: Take 81 mg by mouth daily   pantoprazole (PROTONIX) 40 mg tablet 2021 at Unknown time  No Yes   Sig: Take 1 tablet (40 mg total) by mouth daily      Facility-Administered Medications: None       History reviewed  No pertinent past medical history  Past Surgical History:   Procedure Laterality Date    BREAST CYST ASPIRATION Left        Family History   Problem Relation Age of Onset    No Known Problems Sister     No Known Problems Sister      I have reviewed and agree with the history as documented      E-Cigarette/Vaping    E-Cigarette Use Never User      E-Cigarette/Vaping Substances    Nicotine No     THC No     CBD No     Flavoring No     Other No     Unknown No      Social History     Tobacco Use    Smoking status: Current Every Day Smoker     Packs/day: 1 00     Last attempt to quit: 2012     Years since quittin 0    Smokeless tobacco: Never Used   Vaping Use    Vaping Use: Never used   Substance Use Topics    Alcohol use: Yes     Comment: occ    Drug use: Not Currently       Review of Systems   Constitutional: Negative for chills and fever  HENT: Negative for ear pain and sore throat  Eyes: Negative for pain and visual disturbance  Respiratory: Negative for cough and shortness of breath  Cardiovascular: Negative for chest pain and palpitations  Gastrointestinal: Negative for abdominal pain and vomiting  Genitourinary: Negative for dysuria and hematuria  Musculoskeletal: Negative for arthralgias and back pain  Skin: Negative for color change and rash  Neurological: Positive for dizziness  Negative for seizures, syncope, facial asymmetry, weakness, numbness and headaches  All other systems reviewed and are negative  Physical Exam  Physical Exam  Vitals and nursing note reviewed  Constitutional:       General: She is not in acute distress  Appearance: Normal appearance  HENT:      Head: Atraumatic  Right Ear: External ear normal       Left Ear: External ear normal       Nose: Nose normal       Mouth/Throat:      Mouth: Mucous membranes are moist       Pharynx: Oropharynx is clear  Eyes:      General: No scleral icterus  Extraocular Movements: Extraocular movements intact  Conjunctiva/sclera: Conjunctivae normal       Pupils: Pupils are equal, round, and reactive to light  Cardiovascular:      Rate and Rhythm: Normal rate and regular rhythm  Pulses: Normal pulses  Pulmonary:      Effort: Pulmonary effort is normal  No respiratory distress  Breath sounds: Normal breath sounds  No wheezing, rhonchi or rales  Abdominal:      General: Abdomen is flat  Bowel sounds are normal  There is no distension  Palpations: Abdomen is soft  Tenderness: There is no abdominal tenderness  There is no guarding or rebound  Musculoskeletal:         General: No deformity  Normal range of motion  Skin:     Capillary Refill: Capillary refill takes less than 2 seconds  Findings: No rash  Neurological:      General: No focal deficit present  Mental Status: She is alert and oriented to person, place, and time  Cranial Nerves: No cranial nerve deficit  Sensory: No sensory deficit  Motor: No weakness  Coordination: Coordination normal       Comments: Finger-to-nose within normal limits bilaterally  Vital Signs  ED Triage Vitals [06/28/21 0624]   Temperature Pulse Respirations Blood Pressure SpO2   97 7 °F (36 5 °C) 83 20 139/70 98 %      Temp Source Heart Rate Source Patient Position - Orthostatic VS BP Location FiO2 (%)   Tympanic Monitor Sitting Right arm --      Pain Score       No Pain           Vitals:    06/28/21 0624   BP: 139/70   Pulse: 83   Patient Position - Orthostatic VS: Sitting         Visual Acuity      ED Medications  Medications   sodium chloride 0 9 % bolus 1,000 mL (1,000 mL Intravenous New Bag 6/28/21 0637)   meclizine (ANTIVERT) tablet 50 mg (has no administration in time range)       Diagnostic Studies  Results Reviewed     Procedure Component Value Units Date/Time    UA (URINE) with reflex to Scope [[id number]]     Lab Status: No result Specimen: Urine     TSH [[id number]]     Lab Status: No result Specimen: Blood     CBC and differential [[id number]]     Lab Status: No result Specimen: Blood     Comprehensive metabolic panel [[id number]]     Lab Status: No result Specimen: Blood     Troponin I [[id number]]     Lab Status: No result Specimen: Blood                  CTA head and neck with and without contrast    (Results Pending)              Procedures  Procedures         ED Course                                           MDM  Number of Diagnoses or Management Options  Dizziness  UTI (urinary tract infection)  Diagnosis management comments: Pulse ox 98% room air indicating adequate oxygenation  Signed out next provider Dr Jeannine Toro         Amount and/or Complexity of Data Reviewed  Clinical lab tests: ordered  Tests in the radiology section of CPT®: ordered  Decide to obtain previous medical records or to obtain history from someone other than the patient: yes  Review and summarize past medical records: yes  Discuss the patient with other providers: yes    Patient Progress  Patient progress: stable      Disposition  Final diagnoses:   None     ED Disposition     None      Follow-up Information    None         Patient's Medications   Discharge Prescriptions    No medications on file     No discharge procedures on file      PDMP Review     None          ED Provider  Electronically Signed by           Micheal Mahoney DO  06/29/21 0246

## 2021-06-28 NOTE — CASE MANAGEMENT
SW met with pt to issue observation notice  Pt gave verbal understanding, signed, and was provided a copy  Original placed in bin for scanning

## 2021-06-28 NOTE — ASSESSMENT & PLAN NOTE
Reports some back pain currently likely due to hospital bed  On baby aspirin daily at home for unknown indication  Will continue  Tylenol p r n

## 2021-06-28 NOTE — ED CARE HANDOFF
Emergency Department Sign Out Note        Sign out and transfer of care from previous provider  See Separate Emergency Department note  The patient, Warren Garduno, was evaluated by the previous provider for dizziness and gait disturbance  Workup Completed:  Blood work, UA    ED Course / Workup Pending (followup):  CTA head/neck                                  ED Course as of Jun 28 1150   Mon Jun 28, 2021   0703 Patient is currently awaiting re-evaluation after meclizine as well as CT head/neck  If imaging is negative and patient feels better she can go home  8080 Patient re-examined at bedside  Patient is starting to feel better after meclizine and 500 cc of IV fluids  Patient's UA showed concern for possible UTI  Patient does note increase in urinary frequency  Rocephin ordered  After fluids are completed and IV antibiotics is completed, patient will be ambulated  If patient is able to ambulate without any further dizziness then she will be discharged home  1024 Case discussed with neurology on call, Dr Sage Alvarez who recommends admitting patient for observation and MRI brain to rule out any CVA  Procedures  MDM  Number of Diagnoses or Management Options     Amount and/or Complexity of Data Reviewed  Clinical lab tests: reviewed  Tests in the radiology section of CPT®: reviewed  Tests in the medicine section of CPT®: reviewed and ordered  Review and summarize past medical records: yes  Independent visualization of images, tracings, or specimens: yes    Risk of Complications, Morbidity, and/or Mortality  General comments: Patient's UA showed concern for UTI  Patient does note some increased urinary frequency  IV antibiotics started in the ED  Radiology results were unremarkable  Patient continued to have mental fogginess despite given IV fluids and meclizine  Patient states that walking has improved    At this time case was discussed with neurologist who agreed with MRI for evaluation of posterior stroke  Patient will be admitted for further evaluation and management  Patient agrees with admission plans  Patient Progress  Patient progress: stable      Disposition  Final diagnoses:   Dizziness   UTI (urinary tract infection)     Time reflects when diagnosis was documented in both MDM as applicable and the Disposition within this note     Time User Action Codes Description Comment    6/28/2021 10:54 AM Cierra Balloon, Danitza Fulling Add [R42] Dizziness     6/28/2021 11:50 AM Ferdous, Danitza Fulling Add [N39 0] UTI (urinary tract infection)       ED Disposition     ED Disposition Condition Date/Time Comment    Admit Stable Mon Jun 28, 2021 10:54 AM Case was discussed with Dr Catherien Lucero and the patient's admission status was agreed to be Admission Status: observation status to the service of Dr Catherine Lucero  Follow-up Information    None       Patient's Medications   Discharge Prescriptions    No medications on file     No discharge procedures on file         ED Provider  Electronically Signed by     Kyle Lake DO  06/28/21 DO Juan Carlos  06/28/21 9690

## 2021-06-28 NOTE — ED PROCEDURE NOTE
PROCEDURE  ECG 12 Lead Documentation Only    Date/Time: 6/28/2021 6:31 AM  Performed by: Nancy Monroy DO  Authorized by: Nancy Monroy DO     ECG reviewed by me, the ED Provider: yes    Patient location:  ED  Interpretation:     Interpretation: normal    Rate:     ECG rate:  80    ECG rate assessment: normal    Rhythm:     Rhythm: sinus rhythm    Ectopy:     Ectopy: none    ST segments:     ST segments:  Normal  T waves:     T waves: normal           Nancy Monroy DO  06/28/21 0631

## 2021-06-29 VITALS
TEMPERATURE: 97.9 F | RESPIRATION RATE: 16 BRPM | DIASTOLIC BLOOD PRESSURE: 75 MMHG | HEART RATE: 70 BPM | SYSTOLIC BLOOD PRESSURE: 123 MMHG | HEIGHT: 60 IN | BODY MASS INDEX: 37.22 KG/M2 | WEIGHT: 189.6 LBS | OXYGEN SATURATION: 100 %

## 2021-06-29 PROBLEM — R42 DIZZINESS: Status: RESOLVED | Noted: 2021-06-28 | Resolved: 2021-06-29

## 2021-06-29 LAB
ANION GAP SERPL CALCULATED.3IONS-SCNC: 9 MMOL/L (ref 4–13)
BUN SERPL-MCNC: 14 MG/DL (ref 5–25)
CALCIUM SERPL-MCNC: 8.7 MG/DL (ref 8.3–10.1)
CHLORIDE SERPL-SCNC: 106 MMOL/L (ref 100–108)
CHOLEST SERPL-MCNC: 253 MG/DL (ref 50–200)
CO2 SERPL-SCNC: 27 MMOL/L (ref 21–32)
CREAT SERPL-MCNC: 0.81 MG/DL (ref 0.6–1.3)
EST. AVERAGE GLUCOSE BLD GHB EST-MCNC: 108 MG/DL
GFR SERPL CREATININE-BSD FRML MDRD: 81 ML/MIN/1.73SQ M
GLUCOSE P FAST SERPL-MCNC: 96 MG/DL (ref 65–99)
GLUCOSE SERPL-MCNC: 96 MG/DL (ref 65–140)
HBA1C MFR BLD: 5.4 %
HDLC SERPL-MCNC: 52 MG/DL
LDLC SERPL CALC-MCNC: 168 MG/DL (ref 0–100)
MAGNESIUM SERPL-MCNC: 2.2 MG/DL (ref 1.6–2.6)
POTASSIUM SERPL-SCNC: 4.1 MMOL/L (ref 3.5–5.3)
SODIUM SERPL-SCNC: 142 MMOL/L (ref 136–145)
T4 FREE SERPL-MCNC: 0.98 NG/DL (ref 0.76–1.46)
TRIGL SERPL-MCNC: 167 MG/DL
TSH SERPL DL<=0.05 MIU/L-ACNC: 2.4 UIU/ML (ref 0.36–3.74)

## 2021-06-29 PROCEDURE — 84443 ASSAY THYROID STIM HORMONE: CPT | Performed by: NURSE PRACTITIONER

## 2021-06-29 PROCEDURE — 93306 TTE W/DOPPLER COMPLETE: CPT | Performed by: INTERNAL MEDICINE

## 2021-06-29 PROCEDURE — 80061 LIPID PANEL: CPT | Performed by: NURSE PRACTITIONER

## 2021-06-29 PROCEDURE — 83735 ASSAY OF MAGNESIUM: CPT | Performed by: NURSE PRACTITIONER

## 2021-06-29 PROCEDURE — 80048 BASIC METABOLIC PNL TOTAL CA: CPT | Performed by: NURSE PRACTITIONER

## 2021-06-29 PROCEDURE — 83036 HEMOGLOBIN GLYCOSYLATED A1C: CPT | Performed by: NURSE PRACTITIONER

## 2021-06-29 PROCEDURE — 99217 PR OBSERVATION CARE DISCHARGE MANAGEMENT: CPT | Performed by: INTERNAL MEDICINE

## 2021-06-29 PROCEDURE — 84439 ASSAY OF FREE THYROXINE: CPT | Performed by: NURSE PRACTITIONER

## 2021-06-29 RX ORDER — CEPHALEXIN 500 MG/1
500 CAPSULE ORAL EVERY 12 HOURS SCHEDULED
Qty: 4 CAPSULE | Refills: 0 | Status: SHIPPED | OUTPATIENT
Start: 2021-06-30 | End: 2021-07-02

## 2021-06-29 RX ORDER — MECLIZINE HYDROCHLORIDE 25 MG/1
25 TABLET ORAL EVERY 8 HOURS PRN
Qty: 30 TABLET | Refills: 0 | Status: SHIPPED | OUTPATIENT
Start: 2021-06-29

## 2021-06-29 RX ORDER — NICOTINE 21 MG/24HR
1 PATCH, TRANSDERMAL 24 HOURS TRANSDERMAL DAILY
Qty: 28 PATCH | Refills: 0 | Status: SHIPPED | OUTPATIENT
Start: 2021-06-30

## 2021-06-29 RX ORDER — MECLIZINE HYDROCHLORIDE 25 MG/1
25 TABLET ORAL EVERY 8 HOURS PRN
Status: DISCONTINUED | OUTPATIENT
Start: 2021-06-29 | End: 2021-06-29 | Stop reason: HOSPADM

## 2021-06-29 RX ADMIN — PANTOPRAZOLE SODIUM 40 MG: 40 TABLET, DELAYED RELEASE ORAL at 06:16

## 2021-06-29 RX ADMIN — ASPIRIN 81 MG: 81 TABLET, COATED ORAL at 10:14

## 2021-06-29 RX ADMIN — SODIUM CHLORIDE 100 ML/HR: 0.9 INJECTION, SOLUTION INTRAVENOUS at 06:16

## 2021-06-29 RX ADMIN — CEFTRIAXONE 1000 MG: 1 INJECTION, SOLUTION INTRAVENOUS at 10:13

## 2021-06-29 NOTE — PHYSICAL THERAPY NOTE
PT Screen     06/29/21 1130   PT Last Visit   PT Visit Date 06/29/21   Note Type   Note type Screen   Pain Assessment   Pain Assessment Tool 0-10   Pain Score No Pain   Recommendation   Additional Comments Pt is independent with all functional mobility, normal strength, endurance and balance  No skilled PT needs for this admission  AM-PAC Basic Mobility Inpatient   Turning in Bed Without Bedrails 4   Lying on Back to Sitting on Edge of Flat Bed 4   Moving Bed to Chair 4   Standing Up From Chair 4   Walk in Room 4   Climb 3-5 Stairs 4   Basic Mobility Inpatient Raw Score 24   Basic Mobility Standardized Score 57 59   Licensure   [address] [place] [address] License Number  Zeny Rodriguez Edgard PT  [id number]

## 2021-06-29 NOTE — UTILIZATION REVIEW
Initial Clinical Review    Admission: Date/Time/Statement:   Admission Orders (From admission, onward)     Ordered        06/28/21 1054  Place in Observation  Once                   Orders Placed This Encounter   Procedures    Place in Observation     Standing Status:   Standing     Number of Occurrences:   1     Order Specific Question:   Level of Care     Answer:   Med Surg [16]     ED Arrival Information     Expected Arrival Acuity    - 6/28/2021 06:10 Urgent         Means of arrival Escorted by Service Admission type    Walk-In Self Hospitalist Urgent         Arrival complaint    dizzy        Chief Complaint   Patient presents with    Dizziness     Pt states she feels like she has been walking lop-sided for 3d now  No hx of vertigo  Initial Presentation: This is a pleasant 66-year-old female presents to the hospital dizziness over the past 2-3 days  She denies any fevers chills nausea vomiting or dysuria  In the emergency department there was concerns for stroke UTI     Labs/studies:   CTA head and neck with and without contrast  Result Date: 6/28/2021  Impression: 1  No acute intracranial CT abnormality  2   Patent major vasculature of [place] without high-grade stenosis  No aneurysm  3   Unremarkable CT angiogram of the neck  Workstation performed: [id number]      MRI brain wo contrast  Result Date: 6/28/2021  Impression: No acute intracranial abnormality  Workstation performed: [id number]          Assessment and plan  1  Dizziness  May be secondary to dehydration or vertigo  Symptoms have improved  Continue IV fluids  MRI negative for stroke  Orthostatic vitals negative  2  Possible UTI  Patient have symptoms however does have abdominal discomfort and admission for dizziness  Continue empiric ceftriaxone  3  GERD  Continue PPI  4  Obesity  Body mass index is 37 03 kg/m²      Observation overnight    Patient to be evaluated for discharge tomorrow  Date:    Day 2:     ED Triage Vitals [06/28/21 0624]   Temperature Pulse Respirations Blood Pressure SpO2   97 7 °F (36 5 °C) 83 20 139/70 98 %      Temp Source Heart Rate Source Patient Position - Orthostatic VS BP Location FiO2 (%)   Tympanic Monitor Sitting Right arm --      Pain Score       No Pain          Wt Readings from Last 1 Encounters:   06/28/21 86 kg (189 lb 9 5 oz)     Additional Vital Signs:   Pertinent Labs/Diagnostic Test Results:       Results from last 7 days   Lab Units 06/28/21  0642   WBC Thousand/uL 9 13   HEMOGLOBIN g/dL 12 7   HEMATOCRIT % 39 0   PLATELETS Thousands/uL 300   NEUTROS ABS Thousands/µL 4 54         Results from last 7 days   Lab Units 06/28/21  0642   SODIUM mmol/L 143   POTASSIUM mmol/L 3 7   CHLORIDE mmol/L 105   CO2 mmol/L 26   ANION GAP mmol/L 12   BUN mg/dL 15   CREATININE mg/dL 0 77   EGFR ml/min/1 73sq m 86   CALCIUM mg/dL 9 1     Results from last 7 days   Lab Units 06/28/21  0642   AST U/L 19   ALT U/L 31   ALK PHOS U/L 79   TOTAL PROTEIN g/dL 7 3   ALBUMIN g/dL 3 6   TOTAL BILIRUBIN mg/dL 0 27         Results from last 7 days   Lab Units 06/28/21  0642   GLUCOSE RANDOM mg/dL 122             No results found for: BETA-HYDROXYBUTYRATE                   Results from last 7 days   Lab Units 06/28/21  0642   TROPONIN I ng/mL <0 02             Results from last 7 days   Lab Units 06/28/21  0642   TSH 3RD GENERATON uIU/mL 1 216                                             Results from last 7 days   Lab Units 06/28/21  0749   CLARITY UA  Clear   COLOR UA  Yellow   SPEC GRAV UA  1 015   PH UA  6 5   GLUCOSE UA mg/dl Negative   KETONES UA mg/dl Negative   BLOOD UA  Negative   PROTEIN UA mg/dl Negative   NITRITE UA  Negative   BILIRUBIN UA  Negative   UROBILINOGEN UA E U /dl 0 2   LEUKOCYTES UA  Moderate*   WBC UA /hpf 4-10*   RBC UA /hpf 4-10*   BACTERIA UA /hpf Moderate*   EPITHELIAL CELLS WET PREP /hpf Occasional                                               ED Treatment:   Medication Administration from 06/28/2021 0610 to 06/28/2021 1158       Date/Time Order Dose Route Action Action by Comments     06/28/2021 0858 sodium chloride 0 9 % bolus 1,000 mL 0 mL Intravenous Stopped Liz Arin Florez RN      06/28/2021 3205 sodium chloride 0 9 % bolus 1,000 mL 1,000 mL Intravenous Rachelxenia 37 Mumtaz Paul RN      06/28/2021 3933 meclizine (ANTIVERT) tablet 50 mg 50 mg Oral Given Mumtaz Paul RN      06/28/2021 0809 iohexol (OMNIPAQUE) 350 MG/ML injection (SINGLE-DOSE) 85 mL 85 mL Intravenous Given Melissa Sipel V      06/28/2021 0930 cefTRIAXone (ROCEPHIN) IVPB (premix in dextrose) 1,000 mg 50 mL 0 mg Intravenous Stopped Basim Arizmendi RN      06/28/2021 0859 cefTRIAXone (ROCEPHIN) IVPB (premix in dextrose) 1,000 mg 50 mL 1,000 mg Intravenous New Bag Basim Arizmendi RN         Past Medical History:   Diagnosis Date    Chronic back pain     GERD (gastroesophageal reflux disease)      Present on Admission:   Chronic back pain   GERD (gastroesophageal reflux disease)   Obesity (BMI 30-39  9)      Admitting Diagnosis: Dizziness [R42]  UTI (urinary tract infection) [N39 0]  Age/Sex: 60 y o  female  Admission Orders:  Scheduled Medications:  aspirin, 81 mg, Oral, Daily  cefTRIAXone, 1,000 mg, Intravenous, Q24H  enoxaparin, 40 mg, Subcutaneous, Daily  nicotine, 1 patch, Transdermal, Daily  pantoprazole, 40 mg, Oral, Early Morning      Continuous IV Infusions:  sodium chloride, 100 mL/hr, Intravenous, Continuous      PRN Meds:  acetaminophen, 650 mg, Oral, Q6H PRN  ondansetron, 4 mg, Intravenous, Q6H PRN        IP CONSULT TO NEUROLOGY    Network Utilization Review Department  ATTENTION: Please call with any questions or concerns to [phone] and carefully listen to the prompts so that you are directed to the right person   All voicemails are confidential   Bryn Trey all requests for admission clinical reviews, approved or denied determinations and any other requests to dedicated fax number below belonging to the campus where the patient is receiving treatment   List of dedicated fax numbers for the Facilities:  [address] [place] [address] DENIALS (Administrative/Medical Necessity) [phone]   [address] (Maternity/NICU/Pediatrics) [phone]   [address] Kristel Wahl 4411 [address] [place] [address] [place] Batsheva Castro [address] [place], [place] [address] [phone]

## 2021-06-29 NOTE — DISCHARGE INSTRUCTIONS
Follow-up pending urine culture  Repeat UA as outpatient after treatment to monitor resolution of microscopic hematuria and bacteriuria    May require further workup if persistent

## 2021-06-29 NOTE — DISCHARGE SUMMARY
Discharge Summary - [facility]    Patient Information: Betty Stone 60 y o  female MRN: [id number]  Unit/Bed#: [facility] 326-01 Encounter: [id number]    Discharging Physician / Practitioner: Breck Mohs, MD  PCP: Reymundo Cohen MD  Admission Date: 6/28/2021  Discharge Date: 06/29/21    Reason for Admission: Dizziness (Pt states she feels like she has been walking lop-sided for 3d now  No hx of vertigo )      Discharge Diagnoses:     Principal Problem (Resolved):    Dizziness  Active Problems:    Obesity (BMI 30-39  9)    Chronic back pain    GERD (gastroesophageal reflux disease)    Bacteriuria    Nicotine abuse        * Dizziness-resolved as of 6/29/2021  Assessment & Plan  Patient presents with feeling of dizziness,like "in a cloud" since Saturday afternoon 2 days ago  Reports worse in sitting up or walking, better when laying in bed  Denies focal neuro deficit  Denies family history of CVA or MI   CTA head and neck in ED showed no acute intracranial abnormality, unremarkable CTA  EKG normal sinus rhythm  Troponin negative, telemetry without any arrhythmia  Patient received Antivert, NS bolus, IV Rocephin in ED  MRI brain without any acute abnormality  2D echo normal EF   TSH, B12 within normal limit  Orthostatic negative  Seen by neuro, input appreciated  Likely atypical symptoms in setting of dehydration/possible UTI/BPPV  Clinically improved now asymptomatic  · Recommended hydration  · Diet and lifestyle modification  · Monitoring of symptoms, outpatient PT and meclizine if recurrence of symptoms  Patient was educated      Nicotine abuse  Assessment & Plan  Nicotine patch, smoking cessation  Bacteriuria  Assessment & Plan  Patient asymptomatic, but reports urinary frequency  Denies history of recurrent UTI  Received IV Rocephin in ED  Urine culture pending  Patient received IV Rocephin x2 days    Will do Keflex x 1 day to complete 3 days  Advised to follow-up with PCP to follow-up for hematuria, advised of further workup if persistent    GERD (gastroesophageal reflux disease)  Assessment & Plan  Continue PPI    Chronic back pain  Assessment & Plan  Denies any pain at present    Obesity (BMI 30-39  9)  Assessment & Plan  Body mass index is 37 03 kg/m²  Diet and lifestyle modification      Consultations During Hospital Stay:  Gilbert Jameson TO NEUROLOGY    Procedures Performed:     · None    Significant Findings:     · Refer to hospital course and above listed diagnosis related plan for details  · Hemoglobin A1c 5 4    Imaging while in hospital:    Echo complete with contrast if indicated    Result Date: 2021  Narrative: [facility] [facility] [phone] Transthoracic Echocardiogram 2D, M-mode, Doppler, and Color Doppler Study date:  2021 Patient: Alicia Kwon MR number: [id number] Account number: [de-identified] : 1965 Age: 60 years Gender: Female Status: Outpatient Location: Bedside Height: 62 in Weight: 188 5 lb BP: 109/ 65 mmHg Indications: Dizziness Diagnoses: R42  - Dizziness and giddiness Sonographer:  STEPHEN Jensen Primary Physician:  Kiet Segura MD Referring Physician:  Jaye Brice MD Group:  [facility] Interpreting Physician:  Sol Calderon MD SUMMARY LEFT VENTRICLE: Systolic function was normal  Ejection fraction was estimated in the range of 55 % to 60 % to be 60 %  There were no regional wall motion abnormalities  HISTORY: PRIOR HISTORY: Obesity, GERD, Chronic Pain PROCEDURE: The procedure was performed at the bedside  This was a routine study  The transthoracic approach was used  The study included complete 2D imaging, M-mode, complete spectral Doppler, and color Doppler  The heart rate was 64 bpm, at the start of the study  Image quality was adequate   LEFT VENTRICLE: Size was normal  Systolic function was normal  Ejection fraction was estimated in the range of 55 % to 60 % to be 60 %  There were no regional wall motion abnormalities  Wall thickness was normal  No evidence of apical thrombus  DOPPLER: Left ventricular diastolic function parameters were normal  RIGHT VENTRICLE: The size was normal  Systolic function was normal with TAPSE-2 3cm Wall thickness was normal  LEFT ATRIUM: Size was normal  RIGHT ATRIUM: Size was normal  MITRAL VALVE: Valve structure was normal  There was normal leaflet separation  DOPPLER: The transmitral velocity was within the normal range  There was no evidence for stenosis  There was no significant regurgitation  AORTIC VALVE: The valve was trileaflet  Leaflets exhibited mildly increased thickness and normal cuspal separation  DOPPLER: Transaortic velocity was within the normal range  There was no evidence for stenosis  There was no significant regurgitation  TRICUSPID VALVE: The valve structure was normal  There was normal leaflet separation  DOPPLER: The transtricuspid velocity was within the normal range  There was no evidence for stenosis  There was trace regurgitation  PULMONIC VALVE: Leaflets exhibited normal thickness, no calcification, and normal cuspal separation  DOPPLER: The transpulmonic velocity was within the normal range  There was mild regurgitation  PERICARDIUM: There was no pericardial effusion  The pericardium was normal in appearance  AORTA: The root exhibited normal size  SYSTEMIC VEINS: IVC: The inferior vena cava was normal in size   SYSTEM MEASUREMENT TABLES 2D EF (Teich): 58 3 % %FS: 31 08 % Ao Diam: 3 14 cm EDV(Teich): 140 5 ml ESV(Teich): 58 59 ml HR_2Ch_Q: 63 72 bpm HR_4Ch_Q: 66 79 bpm IVSd: 0 87 cm LA Area: 16 92 cm2 LA Diam: 3 53 cm LVCO_2Ch_Q: 5 39 L/min LVCO_4Ch_Q: 3 45 L/min LVCO_BiP_Q: 4 42 L/min LVEF_2Ch_Q: 58 98 % LVEF_4Ch_Q: 55 19 % LVEF_BiP_Q: 56 52 % LVIDd: 5 39 cm LVIDs: 3 71 cm LVLd_2Ch_Q: 7 71 cm LVLd_4Ch_Q: 8 48 cm LVLs_2Ch_Q: 6 42 cm LVLs_4Ch_Q: 7 06 cm LVPWd: 0 9 cm LVSV_2Ch_Q: 84 54 ml LVSV_4Ch_Q: 51 58 ml LVSV_BiP_Q: 65 27 ml LVVED_2Ch_Q: 143 34 ml LVVED_4Ch_Q: 93 46 ml LVVED_BiP_Q: 115 47 ml LVVES_2Ch_Q: 58 8 ml LVVES_4Ch_Q: 41 88 ml LVVES_BiP_Q: 50 2 ml RA Area: 12 41 cm2 RVIDd: 3 15 cm SV (Teich): 81 91 ml MM TAPSE: 2 29 cm PW MV E/A Ratio: 1 38 E' Sept: 0 1 m/s E/E' Sept: 8 47 MV A Cameron: 0 61 m/s MV Dec Phelps: 3 89 m/s2 MV DecT: 217 15 ms MV E Cameron: 0 84 m/s Intersocietal Commission Accredited Echocardiography Laboratory Prepared and electronically signed by Loco Walters MD Signed 29-Jun-2021 13:29:43     CTA head and neck with and without contrast    Result Date: 6/28/2021  Narrative: CTA NECK AND BRAIN WITH AND WITHOUT CONTRAST INDICATION: dizziness COMPARISON:   None  TECHNIQUE:  Routine CT imaging of the Brain without contrast   Post contrast imaging was performed after administration of iodinated contrast through the neck and brain  Post contrast axial 0 625 mm images timed to opacify the arterial system  3D rendering was performed on an independent workstation  MIP reconstructions performed  Coronal reconstructions were performed of the noncontrast portion of the brain  Radiation dose length product (DLP) for this visit:  681 mGy-cm   This examination, like all CT scans performed in the [facility], was performed utilizing techniques to minimize radiation dose exposure, including the use of iterative reconstruction and automated exposure control  IV Contrast:  85 mL of iohexol (OMNIPAQUE)  IMAGE QUALITY:   Diagnostic FINDINGS: NONCONTRAST BRAIN PARENCHYMA:  No intracranial masslike lesion, mass effect or midline shift  No CT signs of acute infarction  No acute parenchymal hemorrhage  VENTRICLES AND EXTRA-AXIAL SPACES:  Normal for the patient's age  VISUALIZED ORBITS AND PARANASAL SINUSES:  Unremarkable  CERVICAL VASCULATURE AORTIC ARCH AND GREAT VESSELS: Aortic arch and great vessel origins are unremarkable  RIGHT VERTEBRAL ARTERY CERVICAL SEGMENT:The vessel origin is unremarkable    The vessel is patent throughout the neck without high-grade stenosis  LEFT VERTEBRAL ARTERY CERVICAL SEGMENT: The vessel origin is unremarkable  The vessel is patent throughout the neck without high-grade stenosis  RIGHT EXTRACRANIAL CAROTID SEGMENT:The carotid bifurcation and cervical internal carotid artery are unremarkable  No stenosis or dissection  LEFT EXTRACRANIAL CAROTID SEGMENT: There is minimal hydrostatic calcification at the bifurcation  No stenosis or dissection  INTRACRANIAL VASCULATURE INTERNAL CAROTID ARTERIES: The intracranial portions of the internal carotid arteries are unremarkable  Normal ophthalmic artery origins  ANTERIOR CIRCULATION:  Normal A1 segments  Bilateral anterior cerebral arteries are unremarkable  Normal anterior comminuting artery  MIDDLE CEREBRAL ARTERY CIRCULATION:  Bilateral M1 segments and major M2 branches are patent without high-grade stenosis  DISTAL VERTEBRAL ARTERIES:  Distal vertebral arteries are patent without high-grade stenosis  Posterior inferior cerebellar artery origins are patent  BASILAR ARTERY:  Basilar artery is unremarkable  Normal superior cerebellar arteries  POSTERIOR CEREBRAL ARTERIES: 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  Bilateral posterior cerebral arteries are patent without high-grade stenosis  Normal posterior communicating arteries  DURAL VENOUS SINUSES:  Unremarkable  NON VASCULAR ANATOMY BONY STRUCTURES: No acute or aggressive appearing osseous abnormality  SOFT TISSUES OF THE NECK:  Unremarkable  THORACIC INLET:  Unremarkable  Impression: 1  No acute intracranial CT abnormality  2   Patent major vasculature of [place] without high-grade stenosis  No aneurysm  3   Unremarkable CT angiogram of the neck  Workstation performed: [id number]     MRI brain wo contrast    Result Date: 6/28/2021  Narrative: MRI BRAIN WITHOUT CONTRAST INDICATION: Dizziness    COMPARISON:   CT and CT angiography performed earlier today  TECHNIQUE:  Sagittal T1, axial T2, axial FLAIR, axial T1, axial [place] and axial diffusion imaging  IMAGE QUALITY:  Diagnostic  FINDINGS: BRAIN PARENCHYMA:  There is no discrete mass, mass effect or midline shift  There is no intracranial hemorrhage  There is no evidence of acute infarction and diffusion imaging is unremarkable  A few small white matter hyperintensities are noted suggestive of mild chronic microangiopathic change  VENTRICLES:  Normal for the patient's age  SELLA AND PITUITARY GLAND:  Normal  ORBITS:  Normal  PARANASAL SINUSES:  Normal  VASCULATURE:  Evaluation of the major intracranial vasculature demonstrates appropriate flow voids  CALVARIUM AND SKULL BASE:  Normal  EXTRACRANIAL SOFT TISSUES:  Normal      Impression: No acute intracranial abnormality  Workstation performed: [id number]       Incidental Findings:   · None    Test Results Pending at Discharge (will require follow up):   · As per After Visit Summary , urine culture     Outpatient Tests Requested:  · Repeat UA as outpatient    Complications:  Refer to hospital course and above listed diagnosis related plan, if any    Hospital Course: As per HPI  Jose Vasquez is a 60 y o  female patient with history of GERD, chronic back pain, obesity, nicotine abuse who originally presented to the hospital on 6/28/2021 due to feeling of dizziness,like in a cloud  She felt unsteady  Patient denied focal neuro deficit  Patient reports feeling better laying in bed, symptoms worse when sitting up or walking  Reported associated nausea at home, no vomiting  Reports headache this morning  Reports numbness tingling to hands and feet at times which is chronic  Denied feeling of things spinning around her  Denied chest pain, SOB, cough, nausea, vomiting, diarrhea, constipation, fever, chills  Denied urinary urgency, frequency, burning on urination  Denies history of UTI  Denied family history of CVA or MI       Please see above list of diagnoses and related plan for additional information  Condition at Discharge: stable     Discharge Day Visit / Exam:     Subjective:    Anxious to go home  Reports feeling 100% better  Denies any feeling dizziness unsteadiness  Reports history of urinary frequency but denies any fever chills or dysuria      Vitals: Blood Pressure: 123/75 (06/29/21 0759)  Pulse: 70 (06/29/21 0759)  Temperature: 97 9 °F (36 6 °C) (06/29/21 0759)  Temp Source: Oral (06/29/21 0759)  Respirations: 16 (06/29/21 0759)  Height: 5' (152 4 cm) (06/28/21 1212)  Weight - Scale: 86 kg (189 lb 9 5 oz) (06/28/21 1212)  SpO2: 100 % (06/29/21 0759) on room air  Exam:   Physical Exam  Constitutional:       General: She is not in acute distress  Appearance: She is obese  HENT:      Head: Normocephalic and atraumatic  Cardiovascular:      Rate and Rhythm: Normal rate  Pulmonary:      Effort: Pulmonary effort is normal  No respiratory distress  Breath sounds: Normal breath sounds  No wheezing or rales  Abdominal:      General: Bowel sounds are normal  There is no distension  Palpations: Abdomen is soft  Tenderness: There is no abdominal tenderness  There is no guarding or rebound  Musculoskeletal:      Cervical back: Neck supple  Right lower leg: No edema  Left lower leg: No edema  Skin:     General: Skin is warm and dry  Findings: No rash  Neurological:      General: No focal deficit present  Mental Status: She is alert and oriented to person, place, and time  Mental status is at baseline  Discharge instructions/Information to patient and family:(Discharge Medications and Follow up):   See after visit summary for information provided to patient and family  Provisions for Follow-Up Care:  See after visit summary for information related to follow-up care and any pertinent home health orders        Disposition: Home with outpatient PT if needed    Planned Readmission:  No Discharge Statement:  I spent 45 minutes discharging the patient  This time was spent on the day of discharge  I had direct contact with the patient on the day of discharge  Greater than 50% of the total time was spent examining patient, answering all patient questions, arranging and discussing plan of care with patient as well as directly providing post-discharge instructions  Additional time then spent on discharge activities  Coordinated with Neurology at the time of discharge  Discharge Medications:  See after visit summary for reconciled discharge medications provided to patient and family  ** Please Note: "This note has been constructed using a voice recognition system  Therefore there may be syntax, spelling, and/or grammatical errors   Please call if you have any questions  "**

## 2021-06-29 NOTE — OCCUPATIONAL THERAPY NOTE
Occupational Therapy Screen       06/29/21 1531   Note Type   Note type Screen   Cancel Reasons   (Pt independent ADLs/mobility   NO OT needs)   Licensure   [place] License Number  Kemi Schaeffer OTR/LALITA [id number]

## 2021-06-30 LAB — BACTERIA UR CULT: NORMAL

## 2021-07-09 ENCOUNTER — TELEPHONE (OUTPATIENT)
Dept: NEUROLOGY | Facility: CLINIC | Age: 56
End: 2021-07-09

## 2021-07-09 NOTE — TELEPHONE ENCOUNTER
1st Attempt  Spoke with patient to schedule her hospital follow up  Patient declined stated she feels fine  I told her in the future she could always call us for an appointment  SLW/Dizziness/Verify Ins    Notes from chart:  Angelinaisarobert Davidlaura will need follow up in in 3 months with general attending or advance practitioner  She will not require outpatient neurological testing

## 2021-08-17 ENCOUNTER — TELEPHONE (OUTPATIENT)
Dept: GASTROENTEROLOGY | Facility: CLINIC | Age: 56
End: 2021-08-17

## 2021-08-20 ENCOUNTER — OFFICE VISIT (OUTPATIENT)
Dept: OBGYN CLINIC | Facility: CLINIC | Age: 56
End: 2021-08-20
Payer: COMMERCIAL

## 2021-08-20 ENCOUNTER — APPOINTMENT (OUTPATIENT)
Dept: RADIOLOGY | Facility: CLINIC | Age: 56
End: 2021-08-20
Payer: COMMERCIAL

## 2021-08-20 VITALS
WEIGHT: 200 LBS | HEART RATE: 84 BPM | BODY MASS INDEX: 39.27 KG/M2 | SYSTOLIC BLOOD PRESSURE: 108 MMHG | HEIGHT: 60 IN | DIASTOLIC BLOOD PRESSURE: 72 MMHG

## 2021-08-20 DIAGNOSIS — M25.561 RIGHT KNEE PAIN, UNSPECIFIED CHRONICITY: ICD-10-CM

## 2021-08-20 DIAGNOSIS — M17.11 PRIMARY LOCALIZED OSTEOARTHRITIS OF RIGHT KNEE: Primary | ICD-10-CM

## 2021-08-20 DIAGNOSIS — M25.861 PATELLOFEMORAL DYSFUNCTION OF RIGHT KNEE: ICD-10-CM

## 2021-08-20 DIAGNOSIS — M25.561 CHRONIC PAIN OF RIGHT KNEE: ICD-10-CM

## 2021-08-20 DIAGNOSIS — G89.29 CHRONIC PAIN OF RIGHT KNEE: ICD-10-CM

## 2021-08-20 DIAGNOSIS — Z01.89 ENCOUNTER FOR LOWER EXTREMITY COMPARISON IMAGING STUDY: ICD-10-CM

## 2021-08-20 PROCEDURE — 99203 OFFICE O/P NEW LOW 30 MIN: CPT | Performed by: ORTHOPAEDIC SURGERY

## 2021-08-20 PROCEDURE — 73560 X-RAY EXAM OF KNEE 1 OR 2: CPT

## 2021-08-20 PROCEDURE — 73562 X-RAY EXAM OF KNEE 3: CPT

## 2021-08-20 NOTE — PROGRESS NOTES
Assessment/Plan:  1  Primary localized osteoarthritis of right knee  Ambulatory referral to Physical Therapy   2  Patellofemoral dysfunction of right knee  Ambulatory referral to Physical Therapy   3  Chronic pain of right knee  XR knee 3 vw right non injury    Ambulatory referral to Physical Therapy     Scribe Attestation    I,:  Myrna Bumpers am acting as a scribe while in the presence of the attending physician :       I,:  Sarah Diaz, DO personally performed the services described in this documentation    as scribed in my presence :         Ann Cruz is a pleasant 60year old female who presents today for initial evaluation of her right knee pain  After reviewing her imaging and performing a thorough history and physical exam I explained she is suffering with  Localized osteoarthritis in the patellofemoral compartment in her right knee as well as patellofemoral dysfunction  I recommended that she initiate formal therapy for this to optimize her patellofemoral mechanics  I provided her with a referral for this today  She will continue using over-the-counter medications for symptomatic relief as needed  I did encourage her to keep a diary of her symptoms for review when we see her next  We will re-evaluate her in 8 weeks  Subjective:   Initial evaluation for right knee pain    Patient ID: Ema Gustafson is a 60 y o  female  Who presents today for initial evaluation of her right knee pain  At today's visit, she complains a painful popping sensation that occurs about the anterior aspect of her knee  She states that this occurs with activity, of note when she is washing the floor and getting into bed  This painful pop is associated with sharp pain which quickly resolves  The pain can be severe  She denies any recent injury or trauma  Review of Systems   Constitutional: Positive for activity change  Negative for chills, fever and unexpected weight change     HENT: Negative for hearing loss, nosebleeds and sore throat  Eyes: Negative for pain, redness and visual disturbance  Respiratory: Negative for cough, shortness of breath and wheezing  Cardiovascular: Negative for chest pain, palpitations and leg swelling  Gastrointestinal: Negative for abdominal pain, nausea and vomiting  Endocrine: Negative for polydipsia and polyuria  Genitourinary: Negative for dysuria and hematuria  Musculoskeletal: Positive for arthralgias  Negative for joint swelling and myalgias  See HPI   Skin: Negative for rash and wound  Neurological: Negative for dizziness, numbness and headaches  Psychiatric/Behavioral: Negative for decreased concentration and suicidal ideas  The patient is not nervous/anxious            Past Medical History:   Diagnosis Date    Chronic back pain     GERD (gastroesophageal reflux disease)        Past Surgical History:   Procedure Laterality Date    BREAST CYST ASPIRATION Left 2004       Family History   Problem Relation Age of Onset    No Known Problems Sister     No Known Problems Sister        Social History     Occupational History    Not on file   Tobacco Use    Smoking status: Current Every Day Smoker     Packs/day: 1 00     Last attempt to quit: 2012     Years since quittin 1    Smokeless tobacco: Never Used   Vaping Use    Vaping Use: Never used   Substance and Sexual Activity    Alcohol use: Yes     Comment: occ    Drug use: Not Currently    Sexual activity: Not on file         Current Outpatient Medications:     aspirin (ECOTRIN LOW STRENGTH) 81 mg EC tablet, Take 81 mg by mouth daily, Disp: , Rfl:     pantoprazole (PROTONIX) 40 mg tablet, Take 1 tablet (40 mg total) by mouth daily, Disp: 99 tablet, Rfl: 0    meclizine (ANTIVERT) 25 mg tablet, Take 1 tablet (25 mg total) by mouth every 8 (eight) hours as needed for dizziness (Patient not taking: Reported on 2021), Disp: 30 tablet, Rfl: 0    nicotine (NICODERM CQ) 21 mg/24 hr TD 24 hr patch, Place 1 patch on the skin daily, Disp: 28 patch, Rfl: 0    No Known Allergies    Objective:  Vitals:    08/20/21 1321   BP: 108/72   Pulse: 84       Body mass index is 39 06 kg/m²  Right Knee Exam     Tenderness   The patient is experiencing no tenderness  Range of Motion   Right knee extension: 0  Right knee flexion: 120  Tests   Albert:  Medial - negative Lateral - negative  Varus: negative Valgus: negative  Drawer:  Anterior - negative    Posterior - negative  Patellar apprehension: negative    Other   Erythema: absent  Scars: absent  Sensation: normal  Pulse: present  Swelling: none  Effusion: no effusion present    Comments:  Stable at 0, 30 and 90 degrees  Neurovascularly in tact distally  No warmth, erythema or signs of infection  Parapatellar crepitance noted  Patellofemoral grind: positive  No patellar hypermobility          Observations     Right Knee   Negative for effusion  Physical Exam  Vitals and nursing note reviewed  Constitutional:       Appearance: She is well-developed  HENT:      Head: Normocephalic and atraumatic  Eyes:      General: No scleral icterus  Conjunctiva/sclera: Conjunctivae normal    Cardiovascular:      Rate and Rhythm: Normal rate  Pulmonary:      Effort: Pulmonary effort is normal  No respiratory distress  Musculoskeletal:      Cervical back: Normal range of motion and neck supple  Right knee: No effusion  Instability Tests: Medial Albert test negative and lateral Albert test negative  Comments: As noted in HPI   Skin:     General: Skin is warm and dry  Neurological:      Mental Status: She is alert and oriented to person, place, and time  Psychiatric:         Behavior: Behavior normal          I have personally reviewed pertinent films in PACS      X-ray of the right knee obtained on 08/20/21 reviewed demonstrating mild age appropriate degenerative change most prominent in the patellofemoral compartment with joint space narrowing and sclerosis  The tibiofemoral joint spaces are well maintained  There is no acute fracture dislocation, lytic or blastic lesion

## 2022-06-07 ENCOUNTER — OFFICE VISIT (OUTPATIENT)
Dept: OBGYN CLINIC | Facility: CLINIC | Age: 57
End: 2022-06-07
Payer: COMMERCIAL

## 2022-06-07 VITALS
WEIGHT: 207.8 LBS | HEART RATE: 85 BPM | HEIGHT: 60 IN | DIASTOLIC BLOOD PRESSURE: 81 MMHG | BODY MASS INDEX: 40.8 KG/M2 | SYSTOLIC BLOOD PRESSURE: 131 MMHG

## 2022-06-07 DIAGNOSIS — M65.311 TRIGGER THUMB OF RIGHT HAND: Primary | ICD-10-CM

## 2022-06-07 PROCEDURE — 99214 OFFICE O/P EST MOD 30 MIN: CPT | Performed by: ORTHOPAEDIC SURGERY

## 2022-06-07 PROCEDURE — 20550 NJX 1 TENDON SHEATH/LIGAMENT: CPT | Performed by: ORTHOPAEDIC SURGERY

## 2022-06-07 RX ORDER — ALPRAZOLAM 0.25 MG/1
TABLET ORAL
COMMUNITY

## 2022-06-07 RX ORDER — TRIAMCINOLONE ACETONIDE 40 MG/ML
20 INJECTION, SUSPENSION INTRA-ARTICULAR; INTRAMUSCULAR
Status: COMPLETED | OUTPATIENT
Start: 2022-06-07 | End: 2022-06-07

## 2022-06-07 RX ORDER — DIPHENOXYLATE HYDROCHLORIDE AND ATROPINE SULFATE 2.5; .025 MG/1; MG/1
1 TABLET ORAL 4 TIMES DAILY PRN
COMMUNITY

## 2022-06-07 RX ORDER — LIDOCAINE HYDROCHLORIDE 10 MG/ML
0.5 INJECTION, SOLUTION INFILTRATION; PERINEURAL
Status: COMPLETED | OUTPATIENT
Start: 2022-06-07 | End: 2022-06-07

## 2022-06-07 RX ADMIN — LIDOCAINE HYDROCHLORIDE 0.5 ML: 10 INJECTION, SOLUTION INFILTRATION; PERINEURAL at 08:38

## 2022-06-07 RX ADMIN — TRIAMCINOLONE ACETONIDE 20 MG: 40 INJECTION, SUSPENSION INTRA-ARTICULAR; INTRAMUSCULAR at 08:38

## 2022-06-07 NOTE — PROGRESS NOTES
Assessment/Plan:  1  Trigger thumb of right hand  Hand/upper extremity injection: R thumb A1       Scribe Attestation    I,:  Dbeorah Reji Call am acting as a scribe while in the presence of the attending physician :       I,:  Placido Garcia MD personally performed the services described in this documentation    as scribed in my presence :         Hand/upper extremity injection: R thumb A1  Universal Protocol:  Consent: Verbal consent obtained  Risks and benefits: risks, benefits and alternatives were discussed  Consent given by: patient  Timeout called at: 6/7/2022 8:35 AM   Patient understanding: patient states understanding of the procedure being performed  Patient consent: the patient's understanding of the procedure matches consent given    Supporting Documentation  Indications: tendon swelling and pain   Procedure Details  Condition:trigger finger Location: thumb - R thumb A1   Preparation: Patient was prepped and draped in the usual sterile fashion  Needle size: 25 G  Ultrasound guidance: no  Approach: volar  Medications administered: 0 5 mL lidocaine 1 %; 20 mg triamcinolone acetonide 40 mg/mL    Patient tolerance: patient tolerated the procedure well with no immediate complications  Dressing:  Sterile dressing applied             Adry Robbins is presenting with a triggering right thumb  She responded well to steroid injection previously thus I feel she would benefit from an additional injection  Risks and benefits of the injection were discussed and Adry Robbins wished to proceed  Adry Robbins tolerated the injection well with no complications  Post injection instructions were provided  I explained the medication may take up to a week to take effect  She can consider applying ice to the area today as it may be sore initially  Adry Robbins had no further questions  She can follow up with me as needed  Subjective:   Cesilia Nicholas is a 60 y o  female who presents to the office today for a painful right thumb    Adry Robbins states she began experiencing intermittent pain and a catching type sensation in the right thumb approximately 1 month ago  She states she had a similar experience approximately 4-5 years ago and received a steroid injection from  [address] that was successful in resolving her symptoms  She is requesting an additional injection today  Farhad Mauricio states she will wear a brace which seems to help  She works in  and this will exacerbate her symptoms  She is better with rest and the use of her brace  She denies any numbness or tingling into the hand or fingers  Review of Systems   Constitutional: Negative for chills, fever and unexpected weight change  HENT: Negative for hearing loss, nosebleeds and sore throat  Eyes: Negative for pain, redness and visual disturbance  Respiratory: Negative for cough, shortness of breath and wheezing  Cardiovascular: Negative for chest pain, palpitations and leg swelling  Gastrointestinal: Negative for abdominal pain, nausea and vomiting  Endocrine: Negative for polydipsia and polyuria  Genitourinary: Negative for dysuria and hematuria  Musculoskeletal:        See HPI   Skin: Negative for rash and wound  Neurological: Negative for dizziness, numbness and headaches  Psychiatric/Behavioral: Negative for decreased concentration and suicidal ideas  The patient is not nervous/anxious            Past Medical History:   Diagnosis Date    Chronic back pain     GERD (gastroesophageal reflux disease)        Past Surgical History:   Procedure Laterality Date    BREAST CYST ASPIRATION Left        Family History   Problem Relation Age of Onset    No Known Problems Sister     No Known Problems Sister        Social History     Occupational History    Not on file   Tobacco Use    Smoking status: Current Every Day Smoker     Packs/day: 1 00     Last attempt to quit: 2012     Years since quittin 9    Smokeless tobacco: Never Used   Vaping Use  Vaping Use: Never used   Substance and Sexual Activity    Alcohol use: Yes     Comment: occ    Drug use: Not Currently    Sexual activity: Not on file         Current Outpatient Medications:     ALPRAZolam (XANAX) 0 25 mg tablet, Take by mouth daily at bedtime as needed for anxiety, Disp: , Rfl:     aspirin (ECOTRIN LOW STRENGTH) 81 mg EC tablet, Take 81 mg by mouth daily, Disp: , Rfl:     diphenoxylate-atropine (Diphenatol) 2 5-0 025 mg per tablet, Take 1 tablet by mouth 4 (four) times a day as needed for diarrhea, Disp: , Rfl:     pantoprazole (PROTONIX) 40 mg tablet, Take 1 tablet (40 mg total) by mouth daily, Disp: 99 tablet, Rfl: 0    meclizine (ANTIVERT) 25 mg tablet, Take 1 tablet (25 mg total) by mouth every 8 (eight) hours as needed for dizziness (Patient not taking: Reported on 6/7/2022), Disp: 30 tablet, Rfl: 0    nicotine (NICODERM CQ) 21 mg/24 hr TD 24 hr patch, Place 1 patch on the skin daily (Patient not taking: Reported on 6/7/2022), Disp: 28 patch, Rfl: 0    No Known Allergies    Objective:  Vitals:    06/07/22 0807   BP: 131/81   Pulse: 85       Right Hand Exam     Tenderness   The patient is experiencing tenderness in the palmar area  Range of Motion   Hand   MP Thumb: normal   DIP Thumb: normal     Muscle Strength   Right wrist normal muscle strength: 5/5 apb, EPL  : 5/5     Other   Erythema: absent  Sensation: normal  Pulse: present    Comments:  Reproducible triggering of right thumb          Strength/Myotome Testing     Right Wrist/Hand   Right wrist normal muscle strength: 5/5 apb, EPL  Physical Exam  Vitals reviewed  Constitutional:       Appearance: She is well-developed  HENT:      Head: Normocephalic and atraumatic  Eyes:      General:         Right eye: No discharge  Left eye: No discharge  Conjunctiva/sclera: Conjunctivae normal    Cardiovascular:      Rate and Rhythm: Regular rhythm     Pulmonary:      Effort: Pulmonary effort is normal  No respiratory distress  Breath sounds: No stridor  Musculoskeletal:      Cervical back: Normal range of motion and neck supple  Skin:     General: Skin is warm and dry  Neurological:      Mental Status: She is alert and oriented to person, place, and time     Psychiatric:         Behavior: Behavior normal